# Patient Record
Sex: MALE | Race: WHITE
[De-identification: names, ages, dates, MRNs, and addresses within clinical notes are randomized per-mention and may not be internally consistent; named-entity substitution may affect disease eponyms.]

---

## 2018-11-20 ENCOUNTER — HOSPITAL ENCOUNTER (EMERGENCY)
Dept: HOSPITAL 88 - ER | Age: 53
Discharge: HOME | End: 2018-11-20
Payer: COMMERCIAL

## 2018-11-20 VITALS — DIASTOLIC BLOOD PRESSURE: 89 MMHG | SYSTOLIC BLOOD PRESSURE: 116 MMHG

## 2018-11-20 VITALS — HEIGHT: 70 IN | WEIGHT: 280 LBS | BODY MASS INDEX: 40.09 KG/M2

## 2018-11-20 DIAGNOSIS — J45.909: ICD-10-CM

## 2018-11-20 DIAGNOSIS — R05: ICD-10-CM

## 2018-11-20 DIAGNOSIS — E11.9: ICD-10-CM

## 2018-11-20 DIAGNOSIS — I10: ICD-10-CM

## 2018-11-20 DIAGNOSIS — J20.9: ICD-10-CM

## 2018-11-20 DIAGNOSIS — R06.00: Primary | ICD-10-CM

## 2018-11-20 LAB
ALBUMIN SERPL-MCNC: 3.8 G/DL (ref 3.5–5)
ALBUMIN/GLOB SERPL: 1.1 {RATIO} (ref 0.8–2)
ALP SERPL-CCNC: 60 IU/L (ref 40–150)
ALT SERPL-CCNC: 71 IU/L (ref 0–55)
ANION GAP SERPL CALC-SCNC: 14.6 MMOL/L (ref 8–16)
BASOPHILS # BLD AUTO: 0 10*3/UL (ref 0–0.1)
BASOPHILS NFR BLD AUTO: 0.3 % (ref 0–1)
BUN SERPL-MCNC: 9 MG/DL (ref 7–26)
BUN/CREAT SERPL: 8 (ref 6–25)
CALCIUM SERPL-MCNC: 9.4 MG/DL (ref 8.4–10.2)
CHLORIDE SERPL-SCNC: 99 MMOL/L (ref 98–107)
CO2 SERPL-SCNC: 25 MMOL/L (ref 22–29)
DEPRECATED NEUTROPHILS # BLD AUTO: 6.8 10*3/UL (ref 2.1–6.9)
EGFRCR SERPLBLD CKD-EPI 2021: > 60 ML/MIN (ref 60–?)
EOSINOPHIL # BLD AUTO: 0.2 10*3/UL (ref 0–0.4)
EOSINOPHIL NFR BLD AUTO: 2.1 % (ref 0–6)
ERYTHROCYTE [DISTWIDTH] IN CORD BLOOD: 13.1 % (ref 11.7–14.4)
GLOBULIN PLAS-MCNC: 3.4 G/DL (ref 2.3–3.5)
GLUCOSE SERPLBLD-MCNC: 200 MG/DL (ref 74–118)
HCT VFR BLD AUTO: 47.7 % (ref 38.2–49.6)
HGB BLD-MCNC: 16.3 G/DL (ref 14–18)
LYMPHOCYTES # BLD: 0.8 10*3/UL (ref 1–3.2)
LYMPHOCYTES NFR BLD AUTO: 9.1 % (ref 18–39.1)
MCH RBC QN AUTO: 30 PG (ref 28–32)
MCHC RBC AUTO-ENTMCNC: 34.2 G/DL (ref 31–35)
MCV RBC AUTO: 87.8 FL (ref 81–99)
MONOCYTES # BLD AUTO: 0.8 10*3/UL (ref 0.2–0.8)
MONOCYTES NFR BLD AUTO: 9.3 % (ref 4.4–11.3)
NEUTS SEG NFR BLD AUTO: 78.9 % (ref 38.7–80)
PLATELET # BLD AUTO: 136 X10E3/UL (ref 140–360)
POTASSIUM SERPL-SCNC: 3.6 MMOL/L (ref 3.5–5.1)
RBC # BLD AUTO: 5.43 X10E6/UL (ref 4.3–5.7)
SODIUM SERPL-SCNC: 135 MMOL/L (ref 136–145)

## 2018-11-20 PROCEDURE — 83518 IMMUNOASSAY DIPSTICK: CPT

## 2018-11-20 PROCEDURE — 87400 INFLUENZA A/B EACH AG IA: CPT

## 2018-11-20 PROCEDURE — 99284 EMERGENCY DEPT VISIT MOD MDM: CPT

## 2018-11-20 PROCEDURE — 71046 X-RAY EXAM CHEST 2 VIEWS: CPT

## 2018-11-20 PROCEDURE — 36415 COLL VENOUS BLD VENIPUNCTURE: CPT

## 2018-11-20 PROCEDURE — 80053 COMPREHEN METABOLIC PANEL: CPT

## 2018-11-20 PROCEDURE — 85025 COMPLETE CBC W/AUTO DIFF WBC: CPT

## 2018-11-20 PROCEDURE — 93005 ELECTROCARDIOGRAM TRACING: CPT

## 2018-11-20 PROCEDURE — 87070 CULTURE OTHR SPECIMN AEROBIC: CPT

## 2018-11-20 NOTE — DIAGNOSTIC IMAGING REPORT
EXAMINATION: PA and lateral views of the chest.



COMPARISON: None



CLINICAL HISTORY:  Fever, cough

     

DISCUSSION:



Lung volumes are low with patchy opacities in the lung bases compatible with

subsegmental atelectasis. No airspace consolidation, pleural effusion, or

pneumothorax. Incidental note of the accessory azygos fissure. Heart size is

normal. No pulmonary edema.



No acute osseous abnormality.



IMPRESSION: 

Low lung volumes without acute cardiopulmonary abnormality. No consolidative

pneumonia.











Signed by: Dr. Jean Pierre Willis M.D. on 11/20/2018 7:15 AM